# Patient Record
Sex: FEMALE | Race: WHITE | NOT HISPANIC OR LATINO | URBAN - METROPOLITAN AREA
[De-identification: names, ages, dates, MRNs, and addresses within clinical notes are randomized per-mention and may not be internally consistent; named-entity substitution may affect disease eponyms.]

---

## 2017-02-14 ENCOUNTER — OFFICE VISIT - HEALTHEAST (OUTPATIENT)
Dept: INTERNAL MEDICINE | Facility: CLINIC | Age: 82
End: 2017-02-14

## 2017-02-14 DIAGNOSIS — R54 FRAIL ELDERLY: ICD-10-CM

## 2017-02-14 DIAGNOSIS — F17.200 SMOKER: ICD-10-CM

## 2017-02-14 DIAGNOSIS — F03.90 DEMENTIA (H): ICD-10-CM

## 2017-02-14 DIAGNOSIS — F41.9 ANXIETY: ICD-10-CM

## 2017-02-14 DIAGNOSIS — F34.1 DYSTHYMIA: ICD-10-CM

## 2017-02-14 DIAGNOSIS — Z66 DO NOT RESUSCITATE: ICD-10-CM

## 2017-02-14 DIAGNOSIS — I10 BENIGN ESSENTIAL HTN: ICD-10-CM

## 2017-02-14 RX ORDER — ESCITALOPRAM OXALATE 20 MG/1
20 TABLET ORAL DAILY
Qty: 90 TABLET | Refills: 3 | Status: SHIPPED | OUTPATIENT
Start: 2017-02-14 | End: 2018-02-14

## 2017-02-14 ASSESSMENT — MIFFLIN-ST. JEOR: SCORE: 750.67

## 2017-05-15 ENCOUNTER — COMMUNICATION - HEALTHEAST (OUTPATIENT)
Dept: INTERNAL MEDICINE | Facility: CLINIC | Age: 82
End: 2017-05-15

## 2017-05-15 DIAGNOSIS — I10 HYPERTENSION: ICD-10-CM

## 2017-05-17 ENCOUNTER — AMBULATORY - HEALTHEAST (OUTPATIENT)
Dept: INTERNAL MEDICINE | Facility: CLINIC | Age: 82
End: 2017-05-17

## 2017-05-17 RX ORDER — HYDROCHLOROTHIAZIDE 25 MG/1
12.5 TABLET ORAL DAILY
Status: SHIPPED | COMMUNITY
Start: 2017-05-17

## 2017-08-01 ENCOUNTER — COMMUNICATION - HEALTHEAST (OUTPATIENT)
Dept: INTERNAL MEDICINE | Facility: CLINIC | Age: 82
End: 2017-08-01

## 2017-08-01 DIAGNOSIS — Z72.0 TOBACCO ABUSE: ICD-10-CM

## 2017-08-26 ENCOUNTER — COMMUNICATION - HEALTHEAST (OUTPATIENT)
Dept: INTERNAL MEDICINE | Facility: CLINIC | Age: 82
End: 2017-08-26

## 2017-09-19 ENCOUNTER — OFFICE VISIT - HEALTHEAST (OUTPATIENT)
Dept: INTERNAL MEDICINE | Facility: CLINIC | Age: 82
End: 2017-09-19

## 2017-09-19 DIAGNOSIS — F17.200 SMOKER: ICD-10-CM

## 2017-09-19 DIAGNOSIS — I10 BENIGN ESSENTIAL HTN: ICD-10-CM

## 2017-09-19 DIAGNOSIS — Z23 INFLUENZA VACCINATION GIVEN: ICD-10-CM

## 2017-09-19 DIAGNOSIS — F03.90 DEMENTIA (H): ICD-10-CM

## 2017-09-19 DIAGNOSIS — Z63.4 DEATH OF HUSBAND: ICD-10-CM

## 2017-09-19 DIAGNOSIS — Z00.00 ROUTINE HEALTH MAINTENANCE: ICD-10-CM

## 2017-09-19 SDOH — SOCIAL STABILITY - SOCIAL INSECURITY: DISSAPEARANCE AND DEATH OF FAMILY MEMBER: Z63.4

## 2017-09-19 ASSESSMENT — MIFFLIN-ST. JEOR: SCORE: 834.19

## 2017-09-20 ENCOUNTER — COMMUNICATION - HEALTHEAST (OUTPATIENT)
Dept: INTERNAL MEDICINE | Facility: CLINIC | Age: 82
End: 2017-09-20

## 2017-10-16 ENCOUNTER — RECORDS - HEALTHEAST (OUTPATIENT)
Dept: GENERAL RADIOLOGY | Facility: CLINIC | Age: 82
End: 2017-10-16

## 2017-10-16 ENCOUNTER — OFFICE VISIT - HEALTHEAST (OUTPATIENT)
Dept: FAMILY MEDICINE | Facility: CLINIC | Age: 82
End: 2017-10-16

## 2017-10-16 ENCOUNTER — COMMUNICATION - HEALTHEAST (OUTPATIENT)
Dept: FAMILY MEDICINE | Facility: CLINIC | Age: 82
End: 2017-10-16

## 2017-10-16 ENCOUNTER — COMMUNICATION - HEALTHEAST (OUTPATIENT)
Dept: INTERNAL MEDICINE | Facility: CLINIC | Age: 82
End: 2017-10-16

## 2017-10-16 DIAGNOSIS — R10.9 ABDOMINAL PAIN: ICD-10-CM

## 2017-10-16 DIAGNOSIS — R10.9 UNSPECIFIED ABDOMINAL PAIN: ICD-10-CM

## 2017-10-17 ENCOUNTER — COMMUNICATION - HEALTHEAST (OUTPATIENT)
Dept: FAMILY MEDICINE | Facility: CLINIC | Age: 82
End: 2017-10-17

## 2017-10-19 ENCOUNTER — COMMUNICATION - HEALTHEAST (OUTPATIENT)
Dept: INTERNAL MEDICINE | Facility: CLINIC | Age: 82
End: 2017-10-19

## 2017-10-19 DIAGNOSIS — F41.9 ANXIETY: ICD-10-CM

## 2021-01-01 ENCOUNTER — HEALTH MAINTENANCE LETTER (OUTPATIENT)
Age: 86
End: 2021-01-01

## 2021-01-01 ENCOUNTER — RECORDS - HEALTHEAST (OUTPATIENT)
Dept: ADMINISTRATIVE | Facility: CLINIC | Age: 86
End: 2021-01-01

## 2021-01-01 VITALS — HEIGHT: 62 IN | BODY MASS INDEX: 18.09 KG/M2 | WEIGHT: 98.31 LBS

## 2021-01-01 VITALS — WEIGHT: 79.9 LBS | HEIGHT: 62 IN | BODY MASS INDEX: 14.7 KG/M2

## 2021-01-01 VITALS — BODY MASS INDEX: 19.02 KG/M2 | WEIGHT: 104 LBS

## 2021-03-09 ENCOUNTER — RECORDS - HEALTHEAST (OUTPATIENT)
Dept: LAB | Facility: CLINIC | Age: 86
End: 2021-03-09

## 2021-03-10 LAB
ANION GAP SERPL CALCULATED.3IONS-SCNC: 11 MMOL/L (ref 5–18)
BUN SERPL-MCNC: 22 MG/DL (ref 8–28)
CALCIUM SERPL-MCNC: 8.7 MG/DL (ref 8.5–10.5)
CHLORIDE BLD-SCNC: 106 MMOL/L (ref 98–107)
CO2 SERPL-SCNC: 24 MMOL/L (ref 22–31)
CREAT SERPL-MCNC: 0.67 MG/DL (ref 0.6–1.1)
ERYTHROCYTE [DISTWIDTH] IN BLOOD BY AUTOMATED COUNT: 13 % (ref 11–14.5)
GFR SERPL CREATININE-BSD FRML MDRD: >60 ML/MIN/1.73M2
GLUCOSE BLD-MCNC: 197 MG/DL (ref 70–125)
HCT VFR BLD AUTO: 43 % (ref 35–47)
HGB BLD-MCNC: 13.6 G/DL (ref 12–16)
MCH RBC QN AUTO: 31 PG (ref 27–34)
MCHC RBC AUTO-ENTMCNC: 31.6 G/DL (ref 32–36)
MCV RBC AUTO: 98 FL (ref 80–100)
PLATELET # BLD AUTO: 180 THOU/UL (ref 140–440)
PMV BLD AUTO: 12.6 FL (ref 8.5–12.5)
POTASSIUM BLD-SCNC: 3.1 MMOL/L (ref 3.5–5)
RBC # BLD AUTO: 4.39 MILL/UL (ref 3.8–5.4)
SODIUM SERPL-SCNC: 141 MMOL/L (ref 136–145)
WBC: 8.1 THOU/UL (ref 4–11)

## 2021-03-22 ENCOUNTER — RECORDS - HEALTHEAST (OUTPATIENT)
Dept: LAB | Facility: CLINIC | Age: 86
End: 2021-03-22

## 2021-03-24 LAB — POTASSIUM BLD-SCNC: 4.3 MMOL/L (ref 3.5–5)
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
## 2021-06-13 NOTE — PROGRESS NOTES
ASSESSMENT:  1. Abdominal pain  Abdominal x-ray is consistent with stool, constipation is certainly a strong possibility for the pain.    - HM2(CBC w/o Differential)  - C-Reactive Protein(CRP)  - Erythrocyte Sedimentation Rate  - Comprehensive Metabolic Panel  - XR Abdomen Flat and Upright; Future      PLAN:  1.  Laboratory studies pending.  2.  In the meantime would like the patient to try MiraLAX half to a full cup daily until her bowels return to normal.  3.  Should the patient's abdominal pain persists change or worsen she should be reevaluated.       Orders Placed This Encounter   Procedures     XR Abdomen Flat and Upright     Standing Status:   Future     Number of Occurrences:   1     Standing Expiration Date:   10/16/2018     Order Specific Question:   Can the procedure be changed per Radiologist protocol?     Answer:   Yes     HM2(CBC w/o Differential)     C-Reactive Protein(CRP)     Erythrocyte Sedimentation Rate     Comprehensive Metabolic Panel     Medications Discontinued During This Encounter   Medication Reason     ERGOCALCIFEROL, VITAMIN D2, (VITAMIN D2 ORAL) Therapy completed       No Follow-up on file.    CHIEF COMPLAINT:  Chief Complaint   Patient presents with     Abdominal Pain     upper abdomen pain x 5 days - pain which radiates into her back, sore all over body- moving slowly - ? constipation        SUBJECTIVE:  Betty is an 84 y.o. female presenting to the clinic today with upper abdominal pain. Her daughter has accompanied her today.    Abdominal Pain: For roughly the last five days, her daughter states that she has been complaining of pain in primarily the abdomen. Today, the pain mostly affects the upper left quadrant of the abdomen, though at times she complains of the pain afflicting the back. She has had bouts of irritable bowel syndrome in the past. The last time she had a bowel movement, the feces appeared pellet sized. Her daughter thinks that she may be constipated and inquires  if constipation could cause these pains. She has not been taking anything for the pain or for her bowels, though her primary provider had suggested she start something over the counter such as Miralax.     REVIEW OF SYSTEMS:   She suffers from aphasia and dementia.   All other systems are negative.    PFSH:  Social: She continues to live on her own.   Past Medical: She had ovarian cancer in the past.   Immunization History   Administered Date(s) Administered     Influenza high dose, seasonal 09/19/2017     Pneumo Conj 13-V (2010&after) 09/19/2017     Social History     Social History     Marital status:      Spouse name: Saulo     Number of children: 4     Years of education: N/A     Occupational History     Not on file.     Social History Main Topics     Smoking status: Current Every Day Smoker     Smokeless tobacco: Not on file     Alcohol use No     Drug use: No     Sexual activity: Not on file     Other Topics Concern     Not on file     Social History Narrative    Lives with 90+ y/o  who has some dementia. 4 grown kids (dtr Yodit primary contact). +smoker. No etoh reg use or problems, grew up in [facility].      Past Medical History:   Diagnosis Date     Dysphasia 2015    word finding difficulties     History of chemotherapy 1988    Ovarian Ca     Hypertension      Irritable bowel      Osteoporosis      Weight loss      Family History   Problem Relation Age of Onset     Cancer Mother      Heart disease Mother      Diabetes Mother        MEDICATIONS:  Current Outpatient Prescriptions   Medication Sig Dispense Refill     escitalopram oxalate (LEXAPRO) 20 MG tablet Take 1 tablet (20 mg total) by mouth daily. 90 tablet 3     hydroCHLOROthiazide (HYDRODIURIL) 25 MG tablet Take 12.5 mg by mouth daily.       MULTIVITAMIN ORAL Take by mouth.       nicotine (NICODERM CQ) 21 mg/24 hr Place 1 patch on the skin daily for 14 days. (for 2 weeks) 14 patch 0     No current facility-administered medications for  this visit.        TOBACCO USE:  History   Smoking Status     Current Every Day Smoker   Smokeless Tobacco     Not on file       VITALS:  Vitals:    10/16/17 1217   BP: 100/74   Pulse: 70   Weight: 104 lb (47.2 kg)     Wt Readings from Last 3 Encounters:   10/16/17 104 lb (47.2 kg)   09/19/17 (!) 98 lb 5 oz (44.6 kg)   02/14/17 (!) 79 lb 14.4 oz (36.2 kg)       PHYSICAL EXAM:  Constitutional: Patient slow to respond, spoke in incomplete sentences.  Vitals: per nursing notes.  HEENT:  Ears:  External canals, TMs clear.    Eyes:  EOMs full, PERRL.  Lungs: Clear to A&P without rales or wheezes.  Respiratory effort normal.  Cardiac:   Regular rate and rhythm, normal S1, S2, no murmur or gallop.  Abdomen: Slight mild diffuse lower abdominal pain.   Musculoskeletal: No peripheral swelling.  Neuro:  Alert and oriented. Cranial nerves, motor, sensory exams are intact.  No gross focal deficits.  Psychiatric:  Memory intact, mood appropriate.    DATA REVIEWED:  Additional History from Old Records Summarized (2): Reviewed progress note 9/19/17; dementia.   Decision to Obtain Records (1): None.  Radiology Tests Summarized or Ordered (1): Reviewed abdominal CT 9/22/15; tiny pulmonary nodules. Ordered stomach x-ray.   Labs Reviewed or Ordered (1): Reviewed labs 9/19/17; CBC. Ordered labs; CBC, comprehensive, CRP, sedimentation rate.   Medicine Test Summarized or Ordered (1): None.  Independent Review of EKG, X-RAY, or RAPID STREP (2 each): Review of stomach x-ray; shows stool no evidence of bowel obstruction or other serious pathology..     The visit lasted a total of 10 minutes face to face with the patient. Over 50% of the time was spent counseling and educating the patient about abdominal pain.    IJosh, am scribing for and in the presence of, Dr. Hernández.    I, Dr. Hernández, personally performed the services described in this documentation, as scribed by Josh Baez in my presence, and it is both accurate and  complete.    Total data points: 6
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
## 2021-06-15 PROBLEM — Z66 DO NOT RESUSCITATE: Status: ACTIVE | Noted: 2017-02-14

## 2021-06-16 PROBLEM — R53.1 WEAKNESS: Status: ACTIVE | Noted: 2018-01-04

## 2021-06-16 PROBLEM — R26.81 UNSTEADY GAIT: Status: ACTIVE | Noted: 2018-01-04

## 2021-06-16 PROBLEM — R60.9 DEPENDENT EDEMA: Status: ACTIVE | Noted: 2018-01-05

## 2021-06-16 PROBLEM — Z63.4 DEATH OF HUSBAND: Status: ACTIVE | Noted: 2017-09-21

## 2021-06-16 PROBLEM — R47.01 APHASIA: Status: ACTIVE | Noted: 2018-01-04
